# Patient Record
Sex: MALE | Race: WHITE | Employment: OTHER | URBAN - METROPOLITAN AREA
[De-identification: names, ages, dates, MRNs, and addresses within clinical notes are randomized per-mention and may not be internally consistent; named-entity substitution may affect disease eponyms.]

---

## 2017-01-20 RX ORDER — ZOLPIDEM TARTRATE 5 MG/1
TABLET ORAL
Qty: 30 TABLET | Refills: 1 | Status: SHIPPED | OUTPATIENT
Start: 2017-01-20 | End: 2017-04-18

## 2017-02-11 RX ORDER — TADALAFIL 10 MG/1
10 TABLET ORAL
Qty: 90 TABLET | Refills: 0 | Status: SHIPPED | OUTPATIENT
Start: 2017-02-11 | End: 2017-05-12

## 2017-04-18 RX ORDER — ZOLPIDEM TARTRATE 5 MG/1
TABLET ORAL
Qty: 30 TABLET | Refills: 1 | Status: SHIPPED | OUTPATIENT
Start: 2017-04-18 | End: 2017-08-07

## 2017-06-23 RX ORDER — ROSUVASTATIN CALCIUM 10 MG/1
TABLET, COATED ORAL
Qty: 90 TABLET | Refills: 3 | Status: SHIPPED | OUTPATIENT
Start: 2017-06-23 | End: 2017-09-21

## 2017-08-07 RX ORDER — ZOLPIDEM TARTRATE 5 MG/1
5 TABLET ORAL NIGHTLY
Qty: 30 TABLET | Refills: 1
Start: 2017-08-07 | End: 2017-12-06

## 2017-08-07 NOTE — TELEPHONE ENCOUNTER
From: Chichi Beard  Sent: 8/7/2017 4:31 PM CDT  Subject: Medication Renewal Request    Chichi Beard would like a refill of the following medications:  ZOLPIDEM TARTRATE 5 MG Oral Tab Jaylen Alba DO]    Preferred pharmacy: [facility] [phone] -

## 2017-08-17 ENCOUNTER — OFFICE VISIT (OUTPATIENT)
Dept: FAMILY MEDICINE CLINIC | Facility: CLINIC | Age: 62
End: 2017-08-17

## 2017-08-17 VITALS
DIASTOLIC BLOOD PRESSURE: 90 MMHG | SYSTOLIC BLOOD PRESSURE: 132 MMHG | HEIGHT: 71 IN | RESPIRATION RATE: 16 BRPM | TEMPERATURE: 97 F | BODY MASS INDEX: 27.05 KG/M2 | HEART RATE: 52 BPM | WEIGHT: 193.19 LBS

## 2017-08-17 DIAGNOSIS — L03.011 PARONYCHIA OF RIGHT INDEX FINGER: ICD-10-CM

## 2017-08-17 DIAGNOSIS — Z00.00 ROUTINE HEALTH MAINTENANCE: Primary | ICD-10-CM

## 2017-08-17 DIAGNOSIS — H61.23 BILATERAL IMPACTED CERUMEN: ICD-10-CM

## 2017-08-17 DIAGNOSIS — H90.0 CONDUCTIVE HEARING LOSS, BILATERAL: ICD-10-CM

## 2017-08-17 LAB
ALBUMIN SERPL-MCNC: 4.1 G/DL (ref 3.5–4.8)
ALP LIVER SERPL-CCNC: 71 U/L (ref 45–117)
ALT SERPL-CCNC: 41 U/L (ref 17–63)
AST SERPL-CCNC: 17 U/L (ref 15–41)
BILIRUB SERPL-MCNC: 0.7 MG/DL (ref 0.1–2)
BUN BLD-MCNC: 18 MG/DL (ref 8–20)
CALCIUM BLD-MCNC: 9.2 MG/DL (ref 8.3–10.3)
CHLORIDE: 104 MMOL/L (ref 101–111)
CHOLEST SMN-MCNC: 229 MG/DL (ref ?–200)
CO2: 30 MMOL/L (ref 22–32)
COMPLEXED PSA SERPL-MCNC: 4.6 NG/ML (ref 0.01–4)
CREAT BLD-MCNC: 0.91 MG/DL (ref 0.7–1.3)
GLUCOSE BLD-MCNC: 103 MG/DL (ref 70–99)
HDLC SERPL-MCNC: 70 MG/DL (ref 45–?)
HDLC SERPL: 3.27 {RATIO} (ref ?–4.97)
LDLC SERPL CALC-MCNC: 136 MG/DL (ref ?–130)
LDLC SERPL-MCNC: 23 MG/DL (ref 5–40)
M PROTEIN MFR SERPL ELPH: 7.9 G/DL (ref 6.1–8.3)
NONHDLC SERPL-MCNC: 159 MG/DL (ref ?–130)
POTASSIUM SERPL-SCNC: 4.8 MMOL/L (ref 3.6–5.1)
SODIUM SERPL-SCNC: 139 MMOL/L (ref 136–144)
TRIGLYCERIDES: 114 MG/DL (ref ?–150)
TSI SER-ACNC: 3.32 MIU/ML (ref 0.35–5.5)

## 2017-08-17 PROCEDURE — 36415 COLL VENOUS BLD VENIPUNCTURE: CPT | Performed by: FAMILY MEDICINE

## 2017-08-17 PROCEDURE — 99214 OFFICE O/P EST MOD 30 MIN: CPT | Performed by: FAMILY MEDICINE

## 2017-08-17 NOTE — PROGRESS NOTES
Ayo Man is a 58year old male. HPI:   Mr Gini Lopez is here for evaluation of a lesion of the right index finger that has  been there for some time now.  It comes and goes and gets painful and crusted , he h as tried to drain it with little success, also suspicious lesions  HEENT: atraumatic, normocephalic,ears are occluded with cerumen bilaterally and throat are clear  NECK: supple,no adenopathy,no bruits  LUNGS: clear to auscultation  CARDIO: RRR without murmur  EXTREMITIES: no cyanosis, clubbing or afua

## 2017-08-18 ENCOUNTER — TELEPHONE (OUTPATIENT)
Dept: FAMILY MEDICINE CLINIC | Facility: CLINIC | Age: 62
End: 2017-08-18

## 2017-08-18 NOTE — TELEPHONE ENCOUNTER
Patient notified and verbalized understanding. States he is taking the crestor daily as instructed and follows a health diet. States he is very conscious about what he eats and is surprised by the numbers.  Patient states he did not eat anything unhealt

## 2017-08-18 NOTE — TELEPHONE ENCOUNTER
Notes Recorded by Renetta Patel DO on 8/18/2017 at 7:09 AM CDT  Can notify Vanessa Flowers his labs look pretty good, but did he stop the Crestor?   Because his lipids don't look so hot,let's make sure he is taking it and also he needs to do his part to watch his die

## 2017-11-22 ENCOUNTER — TELEPHONE (OUTPATIENT)
Dept: FAMILY MEDICINE CLINIC | Facility: CLINIC | Age: 62
End: 2017-11-22

## 2017-12-07 RX ORDER — ZOLPIDEM TARTRATE 5 MG/1
TABLET ORAL
Qty: 90 TABLET | Refills: 0 | Status: SHIPPED | OUTPATIENT
Start: 2017-12-07 | End: 2018-01-06

## 2018-02-06 RX ORDER — LISINOPRIL AND HYDROCHLOROTHIAZIDE 20; 12.5 MG/1; MG/1
TABLET ORAL
Qty: 90 TABLET | Refills: 3 | Status: SHIPPED | OUTPATIENT
Start: 2018-02-06 | End: 2019-02-14

## 2018-02-06 NOTE — TELEPHONE ENCOUNTER
LOV  08/17/2017  Last refill  12/18/2016  #90 w. 3 RF  Future Appointments  Date Time Provider Jovana Dillon   2/7/2018 9:45 AM DO Lonny Singh [facility]

## 2018-02-07 ENCOUNTER — OFFICE VISIT (OUTPATIENT)
Dept: FAMILY MEDICINE CLINIC | Facility: CLINIC | Age: 63
End: 2018-02-07

## 2018-02-07 VITALS
TEMPERATURE: 98 F | SYSTOLIC BLOOD PRESSURE: 118 MMHG | BODY MASS INDEX: 26.99 KG/M2 | HEART RATE: 60 BPM | DIASTOLIC BLOOD PRESSURE: 78 MMHG | RESPIRATION RATE: 16 BRPM | WEIGHT: 192.81 LBS | HEIGHT: 71 IN

## 2018-02-07 DIAGNOSIS — N28.89 URETEROCELE: ICD-10-CM

## 2018-02-07 DIAGNOSIS — Z00.00 ROUTINE HEALTH MAINTENANCE: Primary | ICD-10-CM

## 2018-02-07 DIAGNOSIS — N40.1 BENIGN PROSTATIC HYPERPLASIA WITH URINARY OBSTRUCTION: ICD-10-CM

## 2018-02-07 DIAGNOSIS — N13.8 BENIGN PROSTATIC HYPERPLASIA WITH URINARY OBSTRUCTION: ICD-10-CM

## 2018-02-07 DIAGNOSIS — E78.00 PURE HYPERCHOLESTEROLEMIA: ICD-10-CM

## 2018-02-07 DIAGNOSIS — I10 ESSENTIAL HYPERTENSION: ICD-10-CM

## 2018-02-07 PROBLEM — N20.0 RENAL STONES: Status: ACTIVE | Noted: 2017-09-07

## 2018-02-07 PROBLEM — K80.40 CALCULUS OF BILE DUCT WITH CHOLECYSTITIS: Status: ACTIVE | Noted: 2017-09-07

## 2018-02-07 LAB
ALBUMIN SERPL-MCNC: 4 G/DL (ref 3.5–4.8)
ALP LIVER SERPL-CCNC: 76 U/L (ref 45–117)
ALT SERPL-CCNC: 49 U/L (ref 17–63)
AST SERPL-CCNC: 27 U/L (ref 15–41)
BILIRUB SERPL-MCNC: 0.7 MG/DL (ref 0.1–2)
BUN BLD-MCNC: 16 MG/DL (ref 8–20)
CALCIUM BLD-MCNC: 9.1 MG/DL (ref 8.3–10.3)
CHLORIDE: 102 MMOL/L (ref 101–111)
CHOLEST SMN-MCNC: 191 MG/DL (ref ?–200)
CO2: 29 MMOL/L (ref 22–32)
COMPLEXED PSA SERPL-MCNC: 4.71 NG/ML (ref 0.01–4)
CREAT BLD-MCNC: 0.9 MG/DL (ref 0.7–1.3)
ERYTHROCYTE [DISTWIDTH] IN BLOOD BY AUTOMATED COUNT: 12.1 % (ref 11.5–16)
GLUCOSE BLD-MCNC: 99 MG/DL (ref 70–99)
HCT VFR BLD AUTO: 46.8 % (ref 37–53)
HDLC SERPL-MCNC: 61 MG/DL (ref 45–?)
HDLC SERPL: 3.13 {RATIO} (ref ?–4.97)
HGB BLD-MCNC: 15.9 G/DL (ref 13–17)
LDLC SERPL CALC-MCNC: 109 MG/DL (ref ?–130)
M PROTEIN MFR SERPL ELPH: 7.5 G/DL (ref 6.1–8.3)
MCH RBC QN AUTO: 30.3 PG (ref 27–33.2)
MCHC RBC AUTO-ENTMCNC: 34 G/DL (ref 31–37)
MCV RBC AUTO: 89.1 FL (ref 80–99)
NONHDLC SERPL-MCNC: 130 MG/DL (ref ?–130)
PLATELET # BLD AUTO: 223 10(3)UL (ref 150–450)
POTASSIUM SERPL-SCNC: 4.1 MMOL/L (ref 3.6–5.1)
RBC # BLD AUTO: 5.25 X10(6)UL (ref 4.3–5.7)
RED CELL DISTRIBUTION WIDTH-SD: 39.6 FL (ref 35.1–46.3)
SODIUM SERPL-SCNC: 138 MMOL/L (ref 136–144)
TRIGL SERPL-MCNC: 105 MG/DL (ref ?–150)
TSI SER-ACNC: 3.1 MIU/ML (ref 0.35–5.5)
VLDLC SERPL CALC-MCNC: 21 MG/DL (ref 5–40)
WBC # BLD AUTO: 5.5 X10(3) UL (ref 4–13)

## 2018-02-07 PROCEDURE — 84443 ASSAY THYROID STIM HORMONE: CPT | Performed by: FAMILY MEDICINE

## 2018-02-07 PROCEDURE — 80053 COMPREHEN METABOLIC PANEL: CPT | Performed by: FAMILY MEDICINE

## 2018-02-07 PROCEDURE — 85027 COMPLETE CBC AUTOMATED: CPT | Performed by: FAMILY MEDICINE

## 2018-02-07 PROCEDURE — 99396 PREV VISIT EST AGE 40-64: CPT | Performed by: FAMILY MEDICINE

## 2018-02-07 PROCEDURE — 80061 LIPID PANEL: CPT | Performed by: FAMILY MEDICINE

## 2018-02-07 RX ORDER — ZOLPIDEM TARTRATE 5 MG/1
1 TABLET ORAL NIGHTLY PRN
COMMUNITY
End: 2018-04-16

## 2018-02-07 RX ORDER — ALFUZOSIN HYDROCHLORIDE 10 MG/1
10 TABLET, EXTENDED RELEASE ORAL DAILY
COMMUNITY
Start: 2017-12-28 | End: 2020-03-12 | Stop reason: ALTCHOICE

## 2018-02-07 RX ORDER — ROSUVASTATIN CALCIUM 10 MG/1
10 TABLET, COATED ORAL DAILY
COMMUNITY
End: 2018-11-15

## 2018-02-07 RX ORDER — TADALAFIL 10 MG/1
10 TABLET ORAL AS NEEDED
COMMUNITY
Start: 2014-05-19 | End: 2018-11-21

## 2018-02-07 NOTE — PROGRESS NOTES
Fiona Morales is a 61year old male who presents for a complete physical exam.   HPI:   Pt complains of nothing at this time he is living in [facility] full time now and is up here visiting his children. Dx with elevated PSA and had an US which was negative on Urox Tablet 24 Hr Take 10 mg by mouth daily. Disp:  Rfl:    Zolpidem Tartrate 5 MG Oral Tab Take 1 tablet by mouth nightly as needed.  Disp:  Rfl:    LISINOPRIL-HYDROCHLOROTHIAZIDE 20-12.5 MG Oral Tab TAKE 1 TABLET BY MOUTH EVERY DAY Disp: 90 tablet Rfl: 3   asp Ht 71\"   Wt 192 lb 12.8 oz   BMI 26.89 kg/m²   Body mass index is 26.89 kg/m².    GENERAL: well developed, well nourished,in no apparent distress  SKIN: no rashes,no suspicious lesions  HEENT: atraumatic, normocephalic,ears and throat are clear  EYES:PERR RESULTS

## 2018-02-08 ENCOUNTER — TELEPHONE (OUTPATIENT)
Dept: FAMILY MEDICINE CLINIC | Facility: CLINIC | Age: 63
End: 2018-02-08

## 2018-02-08 NOTE — TELEPHONE ENCOUNTER
----- Message from Margaret Arechiga DO sent at 2/7/2018  7:34 PM CST -----  Can notify Melania Mayberrybrenda his labs look pretty good overall . His PSA is 4.71, but not sure what it was when his urologist checked it last time. If we get his name we can send it to him.  Keep a

## 2018-04-16 RX ORDER — ZOLPIDEM TARTRATE 5 MG/1
TABLET ORAL
Qty: 90 TABLET | Refills: 0 | Status: SHIPPED
Start: 2018-04-16 | End: 2018-10-04

## 2018-04-17 RX ORDER — ZOLPIDEM TARTRATE 5 MG/1
TABLET ORAL
Qty: 30 TABLET | Refills: 0 | OUTPATIENT
Start: 2018-04-17

## 2018-05-23 ENCOUNTER — PATIENT MESSAGE (OUTPATIENT)
Dept: FAMILY MEDICINE CLINIC | Facility: CLINIC | Age: 63
End: 2018-05-23

## 2018-05-23 NOTE — TELEPHONE ENCOUNTER
From: Kayla Pruitt  To: Briana Munoz DO  Sent: 5/23/2018 9:40 AM CDT  Subject: Test Results Question    Hi Dr. Elmer Moore,    Could your nurse please forward my PSA related test result to my urologist, Dr. Staci Coffman.  Fax number [phone]

## 2018-07-02 RX ORDER — ROSUVASTATIN CALCIUM 10 MG/1
TABLET, COATED ORAL
Qty: 90 TABLET | Refills: 3 | Status: SHIPPED | OUTPATIENT
Start: 2018-07-02 | End: 2018-09-30

## 2018-07-02 NOTE — TELEPHONE ENCOUNTER
Last refill listed as historical  Last lipids 2/7/18  Last seen on 2/7/18  No future appointments. Thank you.

## 2018-10-04 RX ORDER — ZOLPIDEM TARTRATE 5 MG/1
TABLET ORAL
Qty: 90 TABLET | Refills: 0 | Status: SHIPPED | OUTPATIENT
Start: 2018-10-04 | End: 2019-05-10

## 2018-10-18 ENCOUNTER — TELEPHONE (OUTPATIENT)
Dept: FAMILY MEDICINE CLINIC | Facility: CLINIC | Age: 63
End: 2018-10-18

## 2018-10-18 DIAGNOSIS — Z00.00 ROUTINE HEALTH MAINTENANCE: Primary | ICD-10-CM

## 2018-10-18 NOTE — TELEPHONE ENCOUNTER
Pt called, states he asked us to schedule an appt for him with [facility] and he was wondering if we ever made the appt for him?    Please call pt at [phone]

## 2018-10-18 NOTE — TELEPHONE ENCOUNTER
[facility] number  [phone] Phone kervin  Pt wants to do labs before visit- please place orders    Future Appointments   Date Time Provider Jovana Dillon   11/15/2018  2:00 PM [facility] Steve Salazra     Please sent back to RN to ca

## 2018-11-07 ENCOUNTER — TELEPHONE (OUTPATIENT)
Dept: FAMILY MEDICINE CLINIC | Facility: CLINIC | Age: 63
End: 2018-11-07

## 2018-11-13 ENCOUNTER — TELEPHONE (OUTPATIENT)
Dept: FAMILY MEDICINE CLINIC | Facility: CLINIC | Age: 63
End: 2018-11-13

## 2018-11-13 NOTE — TELEPHONE ENCOUNTER
Patient wants to know if we have received the lab results yet for the lab work he had done in [place] a week ago.

## 2018-11-14 ENCOUNTER — PATIENT MESSAGE (OUTPATIENT)
Dept: FAMILY MEDICINE CLINIC | Facility: CLINIC | Age: 63
End: 2018-11-14

## 2018-11-14 NOTE — TELEPHONE ENCOUNTER
From: Donna Houston  To: Aristides Parisi DO  Sent: 11/14/2018 10:50 AM CST  Subject: Test Results Question    My test results are available at [facility] [id number]. I have viewed them online. I don't know why Dr Trent Reveles hasn't received them.  I have a pdf file with them I co

## 2018-11-15 ENCOUNTER — OFFICE VISIT (OUTPATIENT)
Dept: FAMILY MEDICINE CLINIC | Facility: CLINIC | Age: 63
End: 2018-11-15
Payer: COMMERCIAL

## 2018-11-15 VITALS
SYSTOLIC BLOOD PRESSURE: 128 MMHG | TEMPERATURE: 98 F | BODY MASS INDEX: 27.91 KG/M2 | HEART RATE: 60 BPM | RESPIRATION RATE: 16 BRPM | WEIGHT: 197.19 LBS | DIASTOLIC BLOOD PRESSURE: 82 MMHG | HEIGHT: 70.5 IN

## 2018-11-15 DIAGNOSIS — Z00.00 ROUTINE HEALTH MAINTENANCE: Primary | ICD-10-CM

## 2018-11-15 PROCEDURE — 90686 IIV4 VACC NO PRSV 0.5 ML IM: CPT | Performed by: FAMILY MEDICINE

## 2018-11-15 PROCEDURE — 90471 IMMUNIZATION ADMIN: CPT | Performed by: FAMILY MEDICINE

## 2018-11-15 PROCEDURE — 99213 OFFICE O/P EST LOW 20 MIN: CPT | Performed by: FAMILY MEDICINE

## 2018-11-15 NOTE — PROGRESS NOTES
Fiona Morales is a 61year old male who presents for a complete physical exam.   HPI:   Pt complains of having issues with his left shoulder, he was a  and has had issues with his shoulder for a while and has been bothering him, he walks ab 06/03/2016     Lab Results   Component Value Date    AST 27 02/07/2018    AST 17 08/17/2017    AST 23 06/03/2016     Lab Results   Component Value Date    ALT 49 02/07/2018    ALT 41 08/17/2017    ALT 40 06/03/2016     No results found for: ADDI Valenzuela exertion  CARDIOVASCULAR: denies chest pain on exertion  GI: denies abdominal pain,denies heartburn  : denies nocturia or changes in stream  MUSCULOSKELETAL: denies back pain  NEURO: denies headaches  PSYCHE: denies depression or anxiety  HEMATOLOGIC: de understanding of these issues and agrees to the plan.   The patient is asked to return for CPX in 1 year  Routine health maintenance  (primary encounter diagnosis)    Orders Placed This Encounter      Flulaval 6 months and older 0.5 ml Quad PF [38066]  Was

## 2018-11-17 ENCOUNTER — PATIENT OUTREACH (OUTPATIENT)
Dept: FAMILY MEDICINE CLINIC | Facility: CLINIC | Age: 63
End: 2018-11-17

## 2018-11-20 ENCOUNTER — MED REC SCAN ONLY (OUTPATIENT)
Dept: FAMILY MEDICINE CLINIC | Facility: CLINIC | Age: 63
End: 2018-11-20

## 2018-12-10 ENCOUNTER — PATIENT MESSAGE (OUTPATIENT)
Dept: FAMILY MEDICINE CLINIC | Facility: CLINIC | Age: 63
End: 2018-12-10

## 2018-12-10 DIAGNOSIS — E78.2 MIXED HYPERLIPIDEMIA: Primary | ICD-10-CM

## 2018-12-10 NOTE — TELEPHONE ENCOUNTER
From: Imelda Larsen  To: Keeley Bledsoe DO  Sent: 12/10/2018 12:01 PM CST  Subject: Visit Follow-up Question    Hi Dr. Daren Moreno,  [facility] can perform the Cardiac Calcium Scoring CT you requested but they require a doctor's order.    Please fax to

## 2018-12-19 ENCOUNTER — MED REC SCAN ONLY (OUTPATIENT)
Dept: FAMILY MEDICINE CLINIC | Facility: CLINIC | Age: 63
End: 2018-12-19

## 2018-12-27 ENCOUNTER — TELEPHONE (OUTPATIENT)
Dept: FAMILY MEDICINE CLINIC | Facility: CLINIC | Age: 63
End: 2018-12-27

## 2018-12-27 NOTE — TELEPHONE ENCOUNTER
Ron Schmidt, RN  Rangel Chavez Nurse             Check scanning and print copy of record to send to pt

## 2019-02-15 RX ORDER — LISINOPRIL AND HYDROCHLOROTHIAZIDE 20; 12.5 MG/1; MG/1
TABLET ORAL
Qty: 90 TABLET | Refills: 3 | Status: SHIPPED | OUTPATIENT
Start: 2019-02-15 | End: 2020-02-05

## 2019-02-15 NOTE — TELEPHONE ENCOUNTER
Last refilled on 2/6/18 for # 90 with 3 refills  Last BUN 18, creatinine 0.91 on 8/17/17  Last OV 11/15/18, /82  No future appointments. Thank you.

## 2019-05-11 NOTE — TELEPHONE ENCOUNTER
Last refilled on 10/4/18 for # 90 with 0 refills  Last OV 11/15/18  No future appointments. Thank you.

## 2019-05-13 RX ORDER — ZOLPIDEM TARTRATE 5 MG/1
TABLET ORAL
Qty: 90 TABLET | Refills: 0 | Status: SHIPPED
Start: 2019-05-13 | End: 2019-09-22

## 2019-06-11 ENCOUNTER — TELEPHONE (OUTPATIENT)
Dept: FAMILY MEDICINE CLINIC | Facility: CLINIC | Age: 64
End: 2019-06-11

## 2019-06-11 NOTE — TELEPHONE ENCOUNTER
Pt did not return call to office regarding Cologuard testing - from 11/5/18    Order sent to scanning incase pt calls back

## 2019-06-25 RX ORDER — ROSUVASTATIN CALCIUM 10 MG/1
TABLET, COATED ORAL
Qty: 90 TABLET | Refills: 3 | Status: SHIPPED | OUTPATIENT
Start: 2019-06-25 | End: 2020-02-06

## 2019-06-25 NOTE — TELEPHONE ENCOUNTER
LOV: 11/15/18   Last Refill: 7/2/18 #90 3 RF    LAst Lipid: 11/21/18 Tota Chol 174    No future appointments.

## 2019-09-13 NOTE — TELEPHONE ENCOUNTER
Last OV 11/15/18  Last refilled 11/21/18  #88 2 refills    Will need script printed for pickup as he uses [address].

## 2019-09-16 RX ORDER — TADALAFIL 10 MG/1
10 TABLET ORAL AS NEEDED
Qty: 88 TABLET | Refills: 2 | Status: SHIPPED | OUTPATIENT
Start: 2019-09-16 | End: 2020-06-29

## 2019-09-23 RX ORDER — ZOLPIDEM TARTRATE 5 MG/1
TABLET ORAL
Qty: 30 TABLET | Refills: 2 | Status: SHIPPED | OUTPATIENT
Start: 2019-09-23 | End: 2020-02-05

## 2020-02-05 RX ORDER — LISINOPRIL AND HYDROCHLOROTHIAZIDE 20; 12.5 MG/1; MG/1
1 TABLET ORAL
Qty: 90 TABLET | Refills: 3 | OUTPATIENT
Start: 2020-02-05 | End: 2020-05-05

## 2020-02-05 RX ORDER — ZOLPIDEM TARTRATE 5 MG/1
5 TABLET ORAL NIGHTLY
Qty: 30 TABLET | Refills: 2 | Status: SHIPPED | OUTPATIENT
Start: 2020-02-05 | End: 2020-02-06

## 2020-02-05 RX ORDER — LISINOPRIL AND HYDROCHLOROTHIAZIDE 20; 12.5 MG/1; MG/1
1 TABLET ORAL
Qty: 90 TABLET | Refills: 3 | Status: SHIPPED | OUTPATIENT
Start: 2020-02-05 | End: 2020-02-05

## 2020-02-05 RX ORDER — ZOLPIDEM TARTRATE 5 MG/1
5 TABLET ORAL NIGHTLY
Qty: 30 TABLET | Refills: 2 | Status: SHIPPED | OUTPATIENT
Start: 2020-02-05 | End: 2020-02-05

## 2020-02-05 RX ORDER — ZOLPIDEM TARTRATE 5 MG/1
5 TABLET ORAL NIGHTLY
Qty: 30 TABLET | Refills: 2 | OUTPATIENT
Start: 2020-02-05

## 2020-02-05 RX ORDER — LISINOPRIL AND HYDROCHLOROTHIAZIDE 20; 12.5 MG/1; MG/1
1 TABLET ORAL
Qty: 90 TABLET | Refills: 3 | Status: SHIPPED | OUTPATIENT
Start: 2020-02-05 | End: 2020-02-06

## 2020-02-05 NOTE — TELEPHONE ENCOUNTER
Hypertension Medications Protocol Failed2/5 12:40 PM   CMP or BMP in past 12 months    Appointment in past 6 or next 3 months    Last serum creatinine< 2.0     LOV: 11/15/18  Last Refill:  Lisinopril 2/15/19 #90 3 RF  Zolpidem 9/23/19 #30 2 RF    Future Ap

## 2020-02-06 RX ORDER — ZOLPIDEM TARTRATE 5 MG/1
5 TABLET ORAL NIGHTLY
Qty: 30 TABLET | Refills: 2 | Status: SHIPPED | OUTPATIENT
Start: 2020-02-06 | End: 2020-08-24

## 2020-02-06 RX ORDER — ROSUVASTATIN CALCIUM 10 MG/1
TABLET, COATED ORAL
Qty: 90 TABLET | Refills: 3 | Status: SHIPPED | OUTPATIENT
Start: 2020-02-06 | End: 2020-06-29

## 2020-02-06 RX ORDER — LISINOPRIL AND HYDROCHLOROTHIAZIDE 20; 12.5 MG/1; MG/1
1 TABLET ORAL
Qty: 90 TABLET | Refills: 3 | Status: SHIPPED | OUTPATIENT
Start: 2020-02-06 | End: 2020-05-06

## 2020-02-25 DIAGNOSIS — Z00.00 MEDICARE ANNUAL WELLNESS VISIT, INITIAL: Primary | ICD-10-CM

## 2020-03-12 ENCOUNTER — OFFICE VISIT (OUTPATIENT)
Dept: FAMILY MEDICINE CLINIC | Facility: CLINIC | Age: 65
End: 2020-03-12
Payer: MEDICARE

## 2020-03-12 VITALS
WEIGHT: 197.63 LBS | TEMPERATURE: 98 F | SYSTOLIC BLOOD PRESSURE: 130 MMHG | HEART RATE: 64 BPM | RESPIRATION RATE: 16 BRPM | HEIGHT: 71 IN | BODY MASS INDEX: 27.67 KG/M2 | DIASTOLIC BLOOD PRESSURE: 80 MMHG

## 2020-03-12 DIAGNOSIS — N20.0 RENAL STONES: ICD-10-CM

## 2020-03-12 DIAGNOSIS — R94.31 ABNORMAL EKG: ICD-10-CM

## 2020-03-12 DIAGNOSIS — N13.8 BENIGN PROSTATIC HYPERPLASIA WITH URINARY OBSTRUCTION: ICD-10-CM

## 2020-03-12 DIAGNOSIS — N32.3 ACQUIRED BLADDER DIVERTICULUM: ICD-10-CM

## 2020-03-12 DIAGNOSIS — Z11.4 SCREENING FOR HIV (HUMAN IMMUNODEFICIENCY VIRUS): ICD-10-CM

## 2020-03-12 DIAGNOSIS — N40.1 BENIGN PROSTATIC HYPERPLASIA WITH URINARY OBSTRUCTION: ICD-10-CM

## 2020-03-12 DIAGNOSIS — Z11.59 NEED FOR HEPATITIS C SCREENING TEST: ICD-10-CM

## 2020-03-12 DIAGNOSIS — Z12.5 PROSTATE CANCER SCREENING: ICD-10-CM

## 2020-03-12 DIAGNOSIS — Z00.00 ENCOUNTER FOR ANNUAL HEALTH EXAMINATION: ICD-10-CM

## 2020-03-12 DIAGNOSIS — N52.01 ERECTILE DYSFUNCTION DUE TO ARTERIAL INSUFFICIENCY: ICD-10-CM

## 2020-03-12 DIAGNOSIS — Z00.00 MEDICARE ANNUAL WELLNESS VISIT, SUBSEQUENT: Primary | ICD-10-CM

## 2020-03-12 DIAGNOSIS — N28.89 URETEROCELE: ICD-10-CM

## 2020-03-12 DIAGNOSIS — I10 ESSENTIAL HYPERTENSION: ICD-10-CM

## 2020-03-12 DIAGNOSIS — E78.00 PURE HYPERCHOLESTEROLEMIA: ICD-10-CM

## 2020-03-12 DIAGNOSIS — J30.89 NON-SEASONAL ALLERGIC RHINITIS DUE TO OTHER ALLERGIC TRIGGER: ICD-10-CM

## 2020-03-12 DIAGNOSIS — I25.10 CORONARY ARTERY CALCIFICATION: ICD-10-CM

## 2020-03-12 DIAGNOSIS — R06.00 DYSPNEA ON EXERTION: ICD-10-CM

## 2020-03-12 DIAGNOSIS — R97.20 ELEVATED PSA: ICD-10-CM

## 2020-03-12 DIAGNOSIS — I25.84 CORONARY ARTERY CALCIFICATION: ICD-10-CM

## 2020-03-12 LAB — HCV AB SERPL QL IA: NONREACTIVE

## 2020-03-12 PROCEDURE — 86803 HEPATITIS C AB TEST: CPT | Performed by: FAMILY MEDICINE

## 2020-03-12 PROCEDURE — 87389 HIV-1 AG W/HIV-1&-2 AB AG IA: CPT | Performed by: FAMILY MEDICINE

## 2020-03-12 PROCEDURE — G0402 INITIAL PREVENTIVE EXAM: HCPCS | Performed by: FAMILY MEDICINE

## 2020-03-12 PROCEDURE — G0403 EKG FOR INITIAL PREVENT EXAM: HCPCS | Performed by: FAMILY MEDICINE

## 2020-03-12 RX ORDER — TAMSULOSIN HYDROCHLORIDE 0.4 MG/1
1 CAPSULE ORAL DAILY
COMMUNITY
Start: 2019-12-02

## 2020-03-12 RX ORDER — FINASTERIDE 5 MG/1
5 TABLET, FILM COATED ORAL DAILY
COMMUNITY
Start: 2019-10-16 | End: 2020-10-15

## 2020-03-12 RX ORDER — FLUTICASONE PROPIONATE 50 MCG
1-2 SPRAY, SUSPENSION (ML) NASAL AS NEEDED
COMMUNITY

## 2020-03-12 NOTE — PATIENT INSTRUCTIONS
Fahad Wiley's SCREENING SCHEDULE   Tests on this list are recommended by your physician but may not be covered, or covered at this frequency, by your insurer. Please check with your insurance carrier before scheduling to verify coverage.     PREVENTATIV Colonoscopy due on 04/21/2020 Update [facility] if applicable    Flex Sigmoidoscopy Screen  Covered every 5 years No results found for this or any previous visit. No flowsheet data found.      Fecal Occult Blood   Covered Annually No results found f be covered with your prescription benefits, but Medicare does not cover unless Medically needed    Zoster (Not covered by Medicare Part B) No orders found for this or any previous visit.  This may be covered with your pharmacy  prescription benefits     Rec intervals for prediabetic patients        Cardiovascular Disease Screening     Cholesterol, covered every 5 yrs including Total, LDL and Trigs LDL Cholesterol (mg/dL)   Date Value   02/07/2018 109     Cholesterol, Total (mg/dL)   Date Value   02/07/2018 19 performed in visit on 11/15/16   • FLU VAC NO PRSV 4 DAYRON 3 YRS+    Please get every year    Pneumococcal 13 (Prevnar)  Covered Once after 65 Orders placed or performed in visit on 05/24/16   • PNEUMOCOCCAL VACC, 13 DAYRON IM    Please get once after your 65th

## 2020-03-12 NOTE — PROGRESS NOTES
HPI:   Latasha Almonte is a 72year old male who presents for a Medicare Subsequent Annual Wellness visit (Pt already had Initial Annual Wellness).     Roman John is here for hi MWV, he lives most of the year in [place], he had labs done earlier in the year, he has o instructed to get our office a copy of it for scanning into Epic. He has never smoked tobacco.    CAGE Alcohol screening   Kayla Pruitt was screened for Alcohol abuse and had a score of 0 so is at low risk.      Patient Care Team: Patient Care Te hyperlipidemia. He  has a past surgical history that includes cholecystectomy. His family history includes CAD in his father and mother; Heart Disease in his father and mother; Hypertension in his father; Lipids in his father and mother.    SOCIAL HIS distress, appears stated age   Head:  Normocephalic, without obvious abnormality, atraumatic   Eyes:  PERRL, conjunctiva/corneas clear, EOM's intact, both eyes   Ears:  Normal TM's and external ear canals, both ears   Nose: Nares normal, septum midline, mu annual wellness visit, subsequent  -     PSA SCREEN; Future   ALREADY HAD HIS LABS DONE  Essential hypertension   CONTINUE THE LISINOPRIL /HCTZ, DAILY  Pure hypercholesterolemia   CONTINUE CRESTOR MAY JEYSON TO INCREASE TO GET TO GOAL  Renal stones   STABLE shows OLD SEPTAL INFARCT  NO ACUTE ST-T WAVE CHANGES  NORMAL INTERVALS AND AXIS    CARD NUC EXERCISE STRESS TEST (CPT=93117/06982)  ALSO HAD A POSITIVE HEART SCAN SCORE       Diet assessment: good     PLAN:  The patient indicates understanding of these iss due on 02/07/2020  Update Health Maintenance if applicable     Immunizations (Update Immunization Activity if applicable)     Influenza  Covered Annually 11/20/2019   Please get every year    Pneumococcal 13 (Prevnar)  Covered Once after 65 06/03/2016 Plea

## 2020-03-13 ENCOUNTER — TELEPHONE (OUTPATIENT)
Dept: FAMILY MEDICINE CLINIC | Facility: CLINIC | Age: 65
End: 2020-03-13

## 2020-03-13 NOTE — TELEPHONE ENCOUNTER
----- Message from Raimundo Fregoso DO sent at 3/12/2020  6:13 PM CDT -----  Can notify Tabatha Giang his HIV and Hep C were negative

## 2020-03-13 NOTE — TELEPHONE ENCOUNTER
Patient advised. Verbalizes understanding. Patient wants to know if you were able to locate his previous EKG?

## 2020-03-16 ENCOUNTER — PATIENT MESSAGE (OUTPATIENT)
Dept: FAMILY MEDICINE CLINIC | Facility: CLINIC | Age: 65
End: 2020-03-16

## 2020-03-16 NOTE — TELEPHONE ENCOUNTER
From: Yfn Madrid  To: Denny Escamilla DO  Sent: 3/16/2020 10:46 AM CDT  Subject: Visit Follow-up Question    Dr. Libby Reza,    I’m going to call and cancel my stress test for a couple reasons. We are staying with my in-laws and they are 80 and 80.  Probably no

## 2020-03-19 ENCOUNTER — TELEPHONE (OUTPATIENT)
Dept: FAMILY MEDICINE CLINIC | Facility: CLINIC | Age: 65
End: 2020-03-19

## 2020-03-19 NOTE — TELEPHONE ENCOUNTER
Pt states he got a call from [employer]. [facility] but they said they did not get authorization signature and they need it prior to processing the order.

## 2020-03-25 ENCOUNTER — TELEPHONE (OUTPATIENT)
Dept: FAMILY MEDICINE CLINIC | Facility: CLINIC | Age: 65
End: 2020-03-25

## 2020-03-25 NOTE — TELEPHONE ENCOUNTER
Sukumar Mead from [facility] called, there have a conflict with the date collected-stool sample. They have tried repeatedly to contact pt but voice mail is not set up and they cannot leave message. Do we have an exact date of collection by any chance?

## 2020-03-28 ENCOUNTER — TELEPHONE (OUTPATIENT)
Dept: FAMILY MEDICINE CLINIC | Facility: CLINIC | Age: 65
End: 2020-03-28

## 2020-03-28 NOTE — TELEPHONE ENCOUNTER
frank report received and reviewed by SIRENA. Result negative  Repeat in 3 years      Patient notified and verbalized understanding. Health maintenance updated.     Report to scanning

## 2020-06-06 PROBLEM — H43.813 VITREOUS DEGENERATION, BILATERAL: Status: ACTIVE | Noted: 2020-06-06

## 2020-06-06 PROBLEM — H35.379 EPIRETINAL MEMBRANE: Status: ACTIVE | Noted: 2020-02-14

## 2020-06-06 PROBLEM — I25.84 CORONARY ARTERY CALCIFICATION: Status: ACTIVE | Noted: 2020-06-06

## 2020-06-06 PROBLEM — H35.373 PUCKERING OF MACULA, BILATERAL: Status: ACTIVE | Noted: 2020-06-06

## 2020-06-06 PROBLEM — I25.10 CORONARY ARTERY CALCIFICATION: Status: ACTIVE | Noted: 2020-06-06

## 2020-06-06 PROBLEM — H25.11 AGE-RELATED NUCLEAR CATARACT, RIGHT EYE: Status: ACTIVE | Noted: 2020-06-06

## 2020-06-10 ENCOUNTER — MED REC SCAN ONLY (OUTPATIENT)
Dept: FAMILY MEDICINE CLINIC | Facility: CLINIC | Age: 65
End: 2020-06-10

## 2020-06-15 ENCOUNTER — TELEPHONE (OUTPATIENT)
Dept: FAMILY MEDICINE CLINIC | Facility: CLINIC | Age: 65
End: 2020-06-15

## 2020-06-15 DIAGNOSIS — Z82.49 FAMILY HISTORY OF EARLY CAD: Primary | ICD-10-CM

## 2020-06-29 RX ORDER — TADALAFIL 10 MG/1
10 TABLET ORAL AS NEEDED
Qty: 88 TABLET | Refills: 2 | Status: SHIPPED | OUTPATIENT
Start: 2020-06-29 | End: 2020-06-29

## 2020-06-29 RX ORDER — TADALAFIL 10 MG/1
10 TABLET ORAL AS NEEDED
Qty: 88 TABLET | Refills: 2 | Status: SHIPPED | OUTPATIENT
Start: 2020-06-29 | End: 2020-09-27

## 2020-06-29 RX ORDER — ROSUVASTATIN CALCIUM 10 MG/1
TABLET, COATED ORAL
Qty: 90 TABLET | Refills: 3 | Status: SHIPPED | OUTPATIENT
Start: 2020-06-29

## 2020-06-30 ENCOUNTER — TELEPHONE (OUTPATIENT)
Dept: FAMILY MEDICINE CLINIC | Facility: CLINIC | Age: 65
End: 2020-06-30

## 2020-06-30 NOTE — TELEPHONE ENCOUNTER
Tadalafil 10 MG Oral Tab pharmacy called and said the directions are not clear. Said it can't just say take as needed. Max is 20MG a day.

## 2020-06-30 NOTE — TELEPHONE ENCOUNTER
Pt gets filled elsewhere- rn spoke with Pharmacy and cancelled script    Pt gets filled through [address]

## 2020-08-24 RX ORDER — ZOLPIDEM TARTRATE 5 MG/1
TABLET ORAL
Qty: 30 TABLET | Refills: 1 | Status: SHIPPED | OUTPATIENT
Start: 2020-08-24

## 2021-01-28 ENCOUNTER — TELEPHONE (OUTPATIENT)
Dept: FAMILY MEDICINE CLINIC | Facility: CLINIC | Age: 66
End: 2021-01-28

## 2021-03-08 DIAGNOSIS — Z23 NEED FOR VACCINATION: ICD-10-CM

## (undated) NOTE — LETTER
11/17/18        [id number] [facility]      Dear Chadnan Mcconnellos,    [facility] records indicate that you have outstanding lab work and or testing that was ordered for you and has not yet been completed:  Lab Frequency Next Occurrence   CBC, JUAN PABLO